# Patient Record
Sex: FEMALE | Race: WHITE | NOT HISPANIC OR LATINO | Employment: FULL TIME | URBAN - METROPOLITAN AREA
[De-identification: names, ages, dates, MRNs, and addresses within clinical notes are randomized per-mention and may not be internally consistent; named-entity substitution may affect disease eponyms.]

---

## 2024-11-07 ENCOUNTER — OFFICE VISIT (OUTPATIENT)
Dept: URGENT CARE | Age: 49
End: 2024-11-07
Payer: COMMERCIAL

## 2024-11-07 VITALS
SYSTOLIC BLOOD PRESSURE: 142 MMHG | RESPIRATION RATE: 18 BRPM | DIASTOLIC BLOOD PRESSURE: 78 MMHG | HEART RATE: 77 BPM | OXYGEN SATURATION: 98 % | TEMPERATURE: 97.3 F

## 2024-11-07 DIAGNOSIS — B35.1 NAIL FUNGAL INFECTION: Primary | ICD-10-CM

## 2024-11-07 RX ORDER — MUPIROCIN 20 MG/G
OINTMENT TOPICAL
Qty: 22 G | Refills: 0 | Status: SHIPPED | OUTPATIENT
Start: 2024-11-07 | End: 2024-11-17

## 2024-11-07 RX ORDER — SULFAMETHOXAZOLE AND TRIMETHOPRIM 800; 160 MG/1; MG/1
1 TABLET ORAL 2 TIMES DAILY
Qty: 14 TABLET | Refills: 0 | Status: SHIPPED | OUTPATIENT
Start: 2024-11-07 | End: 2024-11-14

## 2024-11-07 NOTE — PROGRESS NOTES
Subjective   Patient ID: Lenore Mcdonough is a 49 y.o. female. They present today with a chief complaint of skin irritation (Irritation around fingernails after getting nails done/C/o itchyness/redness/small blisters).    History of Present Illness  HPI a 49-year-old female arrives to clinic with chief complaint of cuticle skin irritation around all of her fingernails status post manicure.  She started noticing blisters as well.  She has been using cortisone cream with minimal relief.  She is here for further evaluation health maintenance.    Past Medical History  Allergies as of 11/07/2024    (No Known Allergies)       (Not in a hospital admission)       History reviewed. No pertinent past medical history.    History reviewed. No pertinent surgical history.         Review of Systems  Review of Systems  Erythema, swelling, tenderness, itchiness, blisters    Objective    Vitals:    11/07/24 1329   BP: 142/78   Pulse: 77   Resp: 18   Temp: 36.3 °C (97.3 °F)   TempSrc: Temporal   SpO2: 98%     No LMP recorded.    Physical Exam  Redness and irritation surrounding her cuticles of all of her 10 fingers  Procedures    Point of Care Test & Imaging Results from this visit  No results found for this visit on 11/07/24.   No results found.    Diagnostic study results (if any) were reviewed by ANTOINE Padron.    Assessment/Plan   Allergies, medications, history, and pertinent labs/EKGs/Imaging reviewed by ANTOINE Padron.     Medical Decision Making  Signs and symptoms of fungal nail infection.  I did send over mupirocin cream and Bactrim double strength oral tablet twice a day for 7 days.  Please head back to the nail salon and remove all of your gel nails off.  Follow-up with your primary care provider.    As a result of the work-up, the patient was discharged home.  she was informed of her diagnosis and instructed to come back with any concerns or worsening of condition.  she and was agreeable to the plan as  discussed above.  she was given the opportunity to ask questions.  All of the patient's questions were answered.    This document was generated using the assistance of voice recognition software. If there are any errors of spelling, grammar, syntax, or meaning; please feel free to contact me directly for clarification.     Orders and Diagnoses  Diagnoses and all orders for this visit:  Nail fungal infection  -     sulfamethoxazole-trimethoprim (Bactrim DS) 800-160 mg tablet; Take 1 tablet by mouth 2 times a day for 7 days.  -     mupirocin (Bactroban) 2 % ointment; Apply topically 3 times a day for 10 days.      Medical Admin Record      Patient disposition: Home    Electronically signed by ANTOINE Padron  1:38 PM